# Patient Record
Sex: FEMALE | Race: BLACK OR AFRICAN AMERICAN | NOT HISPANIC OR LATINO | URBAN - METROPOLITAN AREA
[De-identification: names, ages, dates, MRNs, and addresses within clinical notes are randomized per-mention and may not be internally consistent; named-entity substitution may affect disease eponyms.]

---

## 2018-07-19 ENCOUNTER — EMERGENCY (EMERGENCY)
Facility: HOSPITAL | Age: 28
LOS: 1 days | Discharge: HOME | End: 2018-07-19
Admitting: EMERGENCY MEDICINE
Payer: MEDICAID

## 2018-07-19 VITALS
HEART RATE: 83 BPM | OXYGEN SATURATION: 99 % | DIASTOLIC BLOOD PRESSURE: 91 MMHG | SYSTOLIC BLOOD PRESSURE: 142 MMHG | RESPIRATION RATE: 16 BRPM | TEMPERATURE: 99 F

## 2018-07-19 DIAGNOSIS — R10.2 PELVIC AND PERINEAL PAIN: ICD-10-CM

## 2018-07-19 DIAGNOSIS — Z88.8 ALLERGY STATUS TO OTHER DRUGS, MEDICAMENTS AND BIOLOGICAL SUBSTANCES: ICD-10-CM

## 2018-07-19 PROCEDURE — 99284 EMERGENCY DEPT VISIT MOD MDM: CPT

## 2018-07-19 NOTE — ED PROVIDER NOTE - MEDICAL DECISION MAKING DETAILS
Pt complaining of painful mass after masturbation. vital signs stable. exam unremarkable. no mass or abnormal lesion seen. Pt was reassured. will do pregnancy test. pt stable for discharge.

## 2018-07-19 NOTE — ED PROVIDER NOTE - CHPI ED SYMPTOMS NEG
no vomiting/no burning urination/no dysuria/no hematuria/no chills/no fever/no vaginal discharge, no flank pain/no nausea

## 2018-07-19 NOTE — ED PROVIDER NOTE - OBJECTIVE STATEMENT
28 y o female with PMHX of mood disorder (on no medication) presents to the ED for pelvic pain. Pt states she is not sexually active and last had intercourse in May. Last menstruation was 1 month ago. Pt states she was masturbating this morning and felt a painful bump in her vaginal area. Is requesting medical checkup. Denies dysuria, burning urination, fever, chills, vaginal discharge, bleeding, flank pain, N/V, or any other sx.

## 2020-10-22 ENCOUNTER — EMERGENCY (EMERGENCY)
Facility: HOSPITAL | Age: 30
LOS: 1 days | Discharge: HOME | End: 2020-10-22
Attending: EMERGENCY MEDICINE | Admitting: EMERGENCY MEDICINE
Payer: MEDICARE

## 2020-10-22 VITALS
SYSTOLIC BLOOD PRESSURE: 149 MMHG | HEART RATE: 81 BPM | DIASTOLIC BLOOD PRESSURE: 87 MMHG | OXYGEN SATURATION: 100 % | TEMPERATURE: 98 F | RESPIRATION RATE: 18 BRPM

## 2020-10-22 VITALS
DIASTOLIC BLOOD PRESSURE: 107 MMHG | RESPIRATION RATE: 20 BRPM | OXYGEN SATURATION: 100 % | TEMPERATURE: 98 F | SYSTOLIC BLOOD PRESSURE: 150 MMHG | HEART RATE: 89 BPM

## 2020-10-22 PROBLEM — F39 UNSPECIFIED MOOD [AFFECTIVE] DISORDER: Chronic | Status: ACTIVE | Noted: 2018-07-30

## 2020-10-22 LAB
ANION GAP SERPL CALC-SCNC: 13 MMOL/L — SIGNIFICANT CHANGE UP (ref 5–17)
APPEARANCE UR: CLEAR — SIGNIFICANT CHANGE UP
BASOPHILS # BLD AUTO: 0.05 K/UL — SIGNIFICANT CHANGE UP (ref 0–0.2)
BASOPHILS NFR BLD AUTO: 1.1 % — SIGNIFICANT CHANGE UP (ref 0–2)
BILIRUB UR-MCNC: NEGATIVE — SIGNIFICANT CHANGE UP
BUN SERPL-MCNC: 9 MG/DL — SIGNIFICANT CHANGE UP (ref 7–23)
CALCIUM SERPL-MCNC: 9.4 MG/DL — SIGNIFICANT CHANGE UP (ref 8.4–10.5)
CHLORIDE SERPL-SCNC: 103 MMOL/L — SIGNIFICANT CHANGE UP (ref 96–108)
CO2 SERPL-SCNC: 24 MMOL/L — SIGNIFICANT CHANGE UP (ref 22–31)
COLOR SPEC: YELLOW — SIGNIFICANT CHANGE UP
CREAT SERPL-MCNC: 0.74 MG/DL — SIGNIFICANT CHANGE UP (ref 0.5–1.3)
DIFF PNL FLD: NEGATIVE — SIGNIFICANT CHANGE UP
EOSINOPHIL # BLD AUTO: 0.06 K/UL — SIGNIFICANT CHANGE UP (ref 0–0.5)
EOSINOPHIL NFR BLD AUTO: 1.3 % — SIGNIFICANT CHANGE UP (ref 0–6)
ETHANOL SERPL-MCNC: 34 MG/DL — HIGH (ref 0–10)
GLUCOSE SERPL-MCNC: 84 MG/DL — SIGNIFICANT CHANGE UP (ref 70–99)
GLUCOSE UR QL: NEGATIVE — SIGNIFICANT CHANGE UP
HCT VFR BLD CALC: 39.1 % — SIGNIFICANT CHANGE UP (ref 34.5–45)
HGB BLD-MCNC: 12.7 G/DL — SIGNIFICANT CHANGE UP (ref 11.5–15.5)
IMM GRANULOCYTES NFR BLD AUTO: 0.2 % — SIGNIFICANT CHANGE UP (ref 0–1.5)
KETONES UR-MCNC: NEGATIVE — SIGNIFICANT CHANGE UP
LEUKOCYTE ESTERASE UR-ACNC: NEGATIVE — SIGNIFICANT CHANGE UP
LYMPHOCYTES # BLD AUTO: 2.66 K/UL — SIGNIFICANT CHANGE UP (ref 1–3.3)
LYMPHOCYTES # BLD AUTO: 57.8 % — HIGH (ref 13–44)
MCHC RBC-ENTMCNC: 28.2 PG — SIGNIFICANT CHANGE UP (ref 27–34)
MCHC RBC-ENTMCNC: 32.5 GM/DL — SIGNIFICANT CHANGE UP (ref 32–36)
MCV RBC AUTO: 86.9 FL — SIGNIFICANT CHANGE UP (ref 80–100)
MONOCYTES # BLD AUTO: 0.26 K/UL — SIGNIFICANT CHANGE UP (ref 0–0.9)
MONOCYTES NFR BLD AUTO: 5.7 % — SIGNIFICANT CHANGE UP (ref 2–14)
NEUTROPHILS # BLD AUTO: 1.56 K/UL — LOW (ref 1.8–7.4)
NEUTROPHILS NFR BLD AUTO: 33.9 % — LOW (ref 43–77)
NITRITE UR-MCNC: NEGATIVE — SIGNIFICANT CHANGE UP
NRBC # BLD: 0 /100 WBCS — SIGNIFICANT CHANGE UP (ref 0–0)
PCP SPEC-MCNC: SIGNIFICANT CHANGE UP
PH UR: 7 — SIGNIFICANT CHANGE UP (ref 5–8)
PLATELET # BLD AUTO: 224 K/UL — SIGNIFICANT CHANGE UP (ref 150–400)
POTASSIUM SERPL-MCNC: 3.8 MMOL/L — SIGNIFICANT CHANGE UP (ref 3.5–5.3)
POTASSIUM SERPL-SCNC: 3.8 MMOL/L — SIGNIFICANT CHANGE UP (ref 3.5–5.3)
PROT UR-MCNC: NEGATIVE MG/DL — SIGNIFICANT CHANGE UP
RBC # BLD: 4.5 M/UL — SIGNIFICANT CHANGE UP (ref 3.8–5.2)
RBC # FLD: 13.3 % — SIGNIFICANT CHANGE UP (ref 10.3–14.5)
SARS-COV-2 RNA SPEC QL NAA+PROBE: SIGNIFICANT CHANGE UP
SODIUM SERPL-SCNC: 140 MMOL/L — SIGNIFICANT CHANGE UP (ref 135–145)
SP GR SPEC: 1.02 — SIGNIFICANT CHANGE UP (ref 1–1.03)
UROBILINOGEN FLD QL: 0.2 E.U./DL — SIGNIFICANT CHANGE UP
WBC # BLD: 4.6 K/UL — SIGNIFICANT CHANGE UP (ref 3.8–10.5)
WBC # FLD AUTO: 4.6 K/UL — SIGNIFICANT CHANGE UP (ref 3.8–10.5)

## 2020-10-22 PROCEDURE — U0003: CPT

## 2020-10-22 PROCEDURE — 93005 ELECTROCARDIOGRAM TRACING: CPT

## 2020-10-22 PROCEDURE — 85025 COMPLETE CBC W/AUTO DIFF WBC: CPT

## 2020-10-22 PROCEDURE — 80307 DRUG TEST PRSMV CHEM ANLYZR: CPT

## 2020-10-22 PROCEDURE — 81003 URINALYSIS AUTO W/O SCOPE: CPT

## 2020-10-22 PROCEDURE — 80048 BASIC METABOLIC PNL TOTAL CA: CPT

## 2020-10-22 PROCEDURE — 99284 EMERGENCY DEPT VISIT MOD MDM: CPT

## 2020-10-22 PROCEDURE — 36415 COLL VENOUS BLD VENIPUNCTURE: CPT

## 2020-10-22 PROCEDURE — 93010 ELECTROCARDIOGRAM REPORT: CPT

## 2020-10-22 PROCEDURE — 99283 EMERGENCY DEPT VISIT LOW MDM: CPT

## 2020-10-22 PROCEDURE — 81025 URINE PREGNANCY TEST: CPT

## 2020-10-22 PROCEDURE — 86769 SARS-COV-2 COVID-19 ANTIBODY: CPT

## 2020-10-22 NOTE — ED ADULT NURSE NOTE - OBJECTIVE STATEMENT
Patient here alert and oriented x 3, recently received a call that she lost her bed in a shelter and states "I am tired of living." Denies any plan for suicide. In the past reports she has gone to [facility] and made statements against the staff there that she would injure or hurt them in an effort to get placed in a psych facility for 28 days. At this time patient is cooperative, answers questions, follows commands but thoughts are disorganized and patient goes form topic to topic speaking and affect changes from crying, to laughing. Pt belongings and clothing secured by security, 1:1 placed with patient as per order.

## 2020-10-22 NOTE — ED PROVIDER NOTE - CLINICAL SUMMARY MEDICAL DECISION MAKING FREE TEXT BOX
pt with transient reported fear of self harm after learning upsetting news about housing. now calm, cooperative, alert and oriented, and goal directed. denies si/hi. acute psych eval in ed not necessary at this time. return precautions discussed

## 2020-10-22 NOTE — ED PROVIDER NOTE - CPE EDP PSYCH NORM
Pt complaining of BLE swelling that started today, pt has hx of DVT. Pt called nurse hotline and was told to come in.    normal...

## 2020-10-22 NOTE — ED PROVIDER NOTE - NSFOLLOWUPINSTRUCTIONS_ED_ALL_ED_FT
Please see your primary care provider.  Call for appointment.  If you have any problems with followup, please call the ED Referral Coordinator at [phone].  Return to the ER if symptoms worsen or other concerns.      Managing Stress, Adult      Feeling a certain amount of stress is normal. Stress helps our body and mind get ready to deal with the demands of life. Stress hormones can motivate you to do well at work and meet your responsibilities. However severe or long-lasting (chronic) stress can affect your mental and physical health. Chronic stress puts you at higher risk for anxiety, depression, and other health problems like digestive problems, muscle aches, heart disease, high blood pressure, and stroke.      What are the causes?  Common causes of stress include:  •Demands from work, such as deadlines, feeling overworked, or having long hours.      •Pressures at home, such as money issues, disagreements with a spouse, or parenting issues.      •Pressures from major life changes, such as divorce, moving, loss of a loved one, or chronic illness.      You may be at higher risk for stress-related problems if you do not get enough sleep, are in poor health, do not have emotional support, or have a mental health disorder like anxiety or depression.      How to recognize stress  Stress can make you:  •Have trouble sleeping.      •Feel sad, anxious, irritable, or overwhelmed.      •Lose your appetite.      •Overeat or want to eat unhealthy foods.      •Want to use drugs or alcohol.    Stress can also cause physical symptoms, such as:  •Sore, tense muscles, especially in the shoulders and neck.      •Headaches.      •Trouble breathing.      •A faster heart rate.      •Stomach pain, nausea, or vomiting.      •Diarrhea or constipation.      •Trouble concentrating.        Follow these instructions at home:    Lifestyle     •Identify the source of your stress and your reaction to it. See a therapist who can help you change your reactions.    •When there are stressful events:  •Talk about it with family, friends, or co-workers.      •Try to think realistically about stressful events and not ignore them or overreact.      •Try to find the positives in a stressful situation and not focus on the negatives.      •Cut back on responsibilities at work and home, if possible. Ask for help from friends or family members if you need it.      •Find ways to cope with stress, such as:  •Meditation.      •Deep breathing.      •Yoga or cristian chi.      •Progressive muscle relaxation.      •Doing art, playing music, or reading.      •Making time for fun activities.      •Spending time with family and friends.        •Get support from family, friends, or spiritual resources.      Eating and drinking   •Eat a healthy diet. This includes:  •Eating foods that are high in fiber, such as beans, whole grains, and fresh fruits and vegetables.      •Limiting foods that are high in fat and processed sugars, such as fried and sweet foods.        • Do not skip meals or overeat.      •Drink enough fluid to keep your urine pale yellow.      Alcohol use   • Do not drink alcohol if:  •Your health care provider tells you not to drink.      •You are pregnant, may be pregnant, or are planning to become pregnant.      •Drinking alcohol is a way some people try to ease their stress. This can be dangerous, so if you drink alcohol:•Limit how much you use to:  •0–1 drink a day for women.      •0–2 drinks a day for men.        •Be aware of how much alcohol is in your drink. In the [place] one drink equals one 12 oz bottle of beer (355 mL), one 5 oz glass of wine (148 mL), or one 1½ oz glass of hard liquor (44 mL).          Activity      •Include 30 minutes of exercise in your daily schedule. Exercise is a good stress reducer.      •Include time in your day for an activity that you find relaxing. Try taking a walk, going on a bike ride, reading a book, or listening to music.      •Schedule your time in a way that lowers stress, and keep a consistent schedule. Prioritize what is most important to get done.      General instructions     •Get enough sleep. Try to go to sleep and get up at about the same time every day.      •Take over-the-counter and prescription medicines only as told by your health care provider.      • Do not use any products that contain nicotine or tobacco, such as cigarettes, e-cigarettes, and chewing tobacco. If you need help quitting, ask your health care provider.      • Do not use drugs or smoke to cope with stress.      •Keep all follow-up visits as told by your health care provider. This is important.        Where to find support    •Talk with your health care provider about stress management or finding a support group.      •Find a therapist to work with you on your stress management techniques.        Contact a health care provider if:    •Your stress symptoms get worse.      •You are unable to manage your stress at home.      •You are struggling to stop using drugs or alcohol.        Get help right away if:    •You may be a danger to yourself or others.      •You have any thoughts of death or suicide.    If you ever feel like you may hurt yourself or others, or have thoughts about taking your own life, get help right away. You can go to your nearest emergency department or call:    • Your local emergency services (911 in the [place]       • A suicide crisis helpline, such as the National Suicide Prevention Lifeline at 1-616.944.2279. This is open 24 hours a day.         Summary    •Feeling a certain amount of stress is normal, but severe or long-lasting (chronic) stress can affect your mental and physical health.      •Chronic stress can put you at higher risk for anxiety, depression, and other health problems like digestive problems, muscle aches, heart disease, high blood pressure, and stroke.      •You may be at higher risk for stress-related problems if you do not get enough sleep, are in poor health, lack emotional support, or have a mental health disorder like anxiety or depression.      •Identify the source of your stress and your reaction to it. Try talking about stressful events with family, friends, or co-workers, finding a coping method, or getting support from spiritual resources.      •If you need more help, talk with your health care provider about finding a support group or a mental health therapist.      This information is not intended to replace advice given to you by your health care provider. Make sure you discuss any questions you have with your health care provider.    suicidal ideation with plan to harm self.  psychiatry consulted. no signs/ symptoms of infection.  denies drug use.  screening labs for medical cause of symptoms sent.  medically cleared for psychiatric treatment.

## 2020-10-22 NOTE — ED ADULT NURSE NOTE - CHIEF COMPLAINT QUOTE
31 y/o female came in to ED c/o SI, denies HI, denies hallucinations. Pt reports having one drink today and smokes marijuana occasionally.

## 2020-10-22 NOTE — ED PROVIDER NOTE - PATIENT PORTAL LINK FT
You can access the FollowMyHealth Patient Portal offered by [facility] by registering at the following website: [email or web address]. By joining [employer]’s FollowMyHealth portal, you will also be able to view your health information using other applications (apps) compatible with our system.

## 2020-10-22 NOTE — ED ADULT TRIAGE NOTE - CHIEF COMPLAINT QUOTE
29 y/o female came in to ED c/o SI, denies HI, denies hallucinations. Pt reports having one drink today and smokes marijuana occasionally.

## 2020-10-22 NOTE — ED PROVIDER NOTE - OBJECTIVE STATEMENT
self presents with "feeling very emotional." Says she has been living in shelter system and today was supposed to get lease for new apartment but instead got informed that her application was denied and she didn't get her own place. Says she was very upset, concerned she may hurt herself or someone else, so she came to the hospital. Now feels like she has calmed down and no longer feels that way. Denies current intent to harm self or others. Says she is just very emotional and relates that she cries at soap operas and other tv shows. Says she felt overwhelmed and upset but it was only transient. Now feels safe, has plan to go back to shelter and start process again. Denies alcohol/ drug use today. No other pain, sob, fever/chills.

## 2020-10-23 LAB
SARS-COV-2 IGG SERPL QL IA: NEGATIVE — SIGNIFICANT CHANGE UP
SARS-COV-2 IGM SERPL IA-ACNC: 0.11 INDEX — SIGNIFICANT CHANGE UP

## 2020-10-26 DIAGNOSIS — Z20.828 CONTACT WITH AND (SUSPECTED) EXPOSURE TO OTHER VIRAL COMMUNICABLE DISEASES: ICD-10-CM

## 2020-10-26 DIAGNOSIS — R45.851 SUICIDAL IDEATIONS: ICD-10-CM

## 2020-10-26 DIAGNOSIS — F43.9 REACTION TO SEVERE STRESS, UNSPECIFIED: ICD-10-CM

## 2021-12-19 NOTE — ED PROVIDER NOTE - GENITOURINARY, MLM
Yes
Performed vaginal exam w/ speculum. No discharge, lesions. No rashes, vesicles, no cervical motion tenderness. No mass seen, trace blood in the vulva w/o discharge, no abscess

## 2023-06-13 NOTE — ED ADULT NURSE NOTE - NSFALLRSKASSESSDT_ED_ALL_ED
=======================================  FYI:     System will autorelease results as soon as they are available. I usually wait for all results to be ready before sending you a comment or message.  Be assured I will review and comment on all of your results as soon as I can.     I am at [facility]  ([phone]) usually Monday, Tuesday and Wednesday.   Messages, evisits, phone calls received on Thursday and Friday may not get responded very urgently but I will try to respond as soon as possible.     2) My schedule sometime been booking out far in advance. I apologize for the lack of timely access. If you need to be seen for a chronic condition or preventive (wellness) visit, please be sure to schedule that appointment few months in advance.  If you have a concern that you feel cannot wait until my next available appointment (such as a hospital follow-up or new symptom of concern) please ask to speak to one of the [facility] nurses who may be able to access a sooner appointment.      You can schedule a video visit for follow-up appointments as well as future appointments for certain conditions.  Please see the below link.     Video Visits ([email or web address])     If you have not already done so,  I encourage you to sign up for [employer] ([email or web address]).  This will allow you to review your results, securely communicate with a provider, and schedule virtual visits as well.  
22-Oct-2020 14:07

## 2025-02-12 NOTE — ED PROVIDER NOTE - CARDIAC, MLM
Patient requesting for an appointment with Dr. Webb the week of 2/17 for a follow up  Patient informed Dr. Yanez is on leave at this time.  Scheduled with GINGER [facility]aires.  Patient states she has an Appointment the same day with eye doctor in the same location.    Future Appointments   Date Time Provider Department Center   2/17/2025  3:40 PM Jessica Galeana APRN [id number] [place]      Normal rate, regular rhythm.  No murmurs, rubs or gallops.